# Patient Record
Sex: MALE | Race: WHITE | NOT HISPANIC OR LATINO | Employment: FULL TIME | URBAN - METROPOLITAN AREA
[De-identification: names, ages, dates, MRNs, and addresses within clinical notes are randomized per-mention and may not be internally consistent; named-entity substitution may affect disease eponyms.]

---

## 2023-07-10 LAB
ALANINE AMINOTRANSFERASE (SGPT) (U/L) IN SER/PLAS: 18 U/L (ref 10–52)
ALBUMIN (G/DL) IN SER/PLAS: 4.6 G/DL (ref 3.4–5)
ALKALINE PHOSPHATASE (U/L) IN SER/PLAS: 41 U/L (ref 33–120)
ANION GAP IN SER/PLAS: 13 MMOL/L (ref 10–20)
ASPARTATE AMINOTRANSFERASE (SGOT) (U/L) IN SER/PLAS: 17 U/L (ref 9–39)
BILIRUBIN TOTAL (MG/DL) IN SER/PLAS: 0.5 MG/DL (ref 0–1.2)
CALCIDIOL (25 OH VITAMIN D3) (NG/ML) IN SER/PLAS: 31 NG/ML
CALCIUM (MG/DL) IN SER/PLAS: 9.8 MG/DL (ref 8.6–10.6)
CARBON DIOXIDE, TOTAL (MMOL/L) IN SER/PLAS: 28 MMOL/L (ref 21–32)
CHLORIDE (MMOL/L) IN SER/PLAS: 103 MMOL/L (ref 98–107)
CHOLESTEROL (MG/DL) IN SER/PLAS: 203 MG/DL (ref 0–199)
CHOLESTEROL IN HDL (MG/DL) IN SER/PLAS: 93 MG/DL
CHOLESTEROL/HDL RATIO: 2.2
COBALAMIN (VITAMIN B12) (PG/ML) IN SER/PLAS: 243 PG/ML (ref 211–911)
CREATININE (MG/DL) IN SER/PLAS: 0.83 MG/DL (ref 0.5–1.3)
ERYTHROCYTE DISTRIBUTION WIDTH (RATIO) BY AUTOMATED COUNT: 13.2 % (ref 11.5–14.5)
ERYTHROCYTE MEAN CORPUSCULAR HEMOGLOBIN CONCENTRATION (G/DL) BY AUTOMATED: 31.9 G/DL (ref 32–36)
ERYTHROCYTE MEAN CORPUSCULAR VOLUME (FL) BY AUTOMATED COUNT: 96 FL (ref 80–100)
ERYTHROCYTES (10*6/UL) IN BLOOD BY AUTOMATED COUNT: 4.06 X10E12/L (ref 4.5–5.9)
FOLATE (NG/ML) IN SER/PLAS: 13.5 NG/ML
GFR MALE: >90 ML/MIN/1.73M2
GLUCOSE (MG/DL) IN SER/PLAS: 75 MG/DL (ref 74–99)
HEMATOCRIT (%) IN BLOOD BY AUTOMATED COUNT: 38.9 % (ref 41–52)
HEMOGLOBIN (G/DL) IN BLOOD: 12.4 G/DL (ref 13.5–17.5)
LDL: 98 MG/DL (ref 0–99)
LEUKOCYTES (10*3/UL) IN BLOOD BY AUTOMATED COUNT: 5.3 X10E9/L (ref 4.4–11.3)
NRBC (PER 100 WBCS) BY AUTOMATED COUNT: 0 /100 WBC (ref 0–0)
PLATELETS (10*3/UL) IN BLOOD AUTOMATED COUNT: 181 X10E9/L (ref 150–450)
POTASSIUM (MMOL/L) IN SER/PLAS: 4.8 MMOL/L (ref 3.5–5.3)
PROSTATE SPECIFIC ANTIGEN,SCREEN: 0.72 NG/ML (ref 0–4)
PROTEIN TOTAL: 7.5 G/DL (ref 6.4–8.2)
SODIUM (MMOL/L) IN SER/PLAS: 139 MMOL/L (ref 136–145)
THYROTROPIN (MIU/L) IN SER/PLAS BY DETECTION LIMIT <= 0.05 MIU/L: 1.58 MIU/L (ref 0.44–3.98)
TRIGLYCERIDE (MG/DL) IN SER/PLAS: 59 MG/DL (ref 0–149)
URATE (MG/DL) IN SER/PLAS: 7.1 MG/DL (ref 4–7.5)
UREA NITROGEN (MG/DL) IN SER/PLAS: 8 MG/DL (ref 6–23)
VLDL: 12 MG/DL (ref 0–40)

## 2023-07-13 LAB
FERRITIN (UG/LL) IN SER/PLAS: 363 UG/L (ref 20–300)
IRON (UG/DL) IN SER/PLAS: 177 UG/DL (ref 35–150)
IRON BINDING CAPACITY (UG/DL) IN SER/PLAS: 430 UG/DL (ref 240–445)
IRON SATURATION (%) IN SER/PLAS: 41 % (ref 25–45)

## 2024-01-08 ENCOUNTER — OFFICE VISIT (OUTPATIENT)
Dept: CARDIOLOGY | Facility: CLINIC | Age: 54
End: 2024-01-08
Payer: COMMERCIAL

## 2024-01-08 VITALS
BODY MASS INDEX: 33.92 KG/M2 | DIASTOLIC BLOOD PRESSURE: 70 MMHG | HEART RATE: 102 BPM | SYSTOLIC BLOOD PRESSURE: 120 MMHG | HEIGHT: 69 IN | WEIGHT: 229 LBS

## 2024-01-08 DIAGNOSIS — E78.5 HYPERLIPIDEMIA, UNSPECIFIED HYPERLIPIDEMIA TYPE: ICD-10-CM

## 2024-01-08 DIAGNOSIS — E66.3 OVERWEIGHT: ICD-10-CM

## 2024-01-08 DIAGNOSIS — I10 ESSENTIAL HYPERTENSION: Primary | ICD-10-CM

## 2024-01-08 PROBLEM — R22.42 LOCALIZED SWELLING OF LEFT LOWER EXTREMITY: Status: ACTIVE | Noted: 2024-01-08

## 2024-01-08 PROBLEM — R39.11 URINARY HESITANCY: Status: ACTIVE | Noted: 2024-01-08

## 2024-01-08 PROBLEM — M25.50 ARTHRALGIA: Status: ACTIVE | Noted: 2024-01-08

## 2024-01-08 PROBLEM — E55.9 VITAMIN D DEFICIENCY: Status: ACTIVE | Noted: 2024-01-08

## 2024-01-08 PROBLEM — R61 EXCESSIVE SWEATING: Status: ACTIVE | Noted: 2024-01-08

## 2024-01-08 PROBLEM — R11.0 NAUSEA IN ADULT: Status: ACTIVE | Noted: 2024-01-08

## 2024-01-08 PROBLEM — R42 LIGHTHEADEDNESS: Status: ACTIVE | Noted: 2024-01-08

## 2024-01-08 PROBLEM — I87.322 CHRONIC VENOUS HYPERTENSION WITH INFLAMMATION INVOLVING LEFT SIDE: Status: ACTIVE | Noted: 2024-01-08

## 2024-01-08 PROBLEM — N52.9 ERECTILE DYSFUNCTION: Status: ACTIVE | Noted: 2024-01-08

## 2024-01-08 PROBLEM — M10.9 GOUT: Status: ACTIVE | Noted: 2024-01-08

## 2024-01-08 PROBLEM — R19.4 BOWEL HABIT CHANGES: Status: ACTIVE | Noted: 2024-01-08

## 2024-01-08 PROBLEM — R07.9 CHEST PAIN OF UNCERTAIN ETIOLOGY: Status: ACTIVE | Noted: 2024-01-08

## 2024-01-08 PROBLEM — R79.89 LFT ELEVATION: Status: ACTIVE | Noted: 2024-01-08

## 2024-01-08 PROBLEM — B35.1 ONYCHOMYCOSIS: Status: ACTIVE | Noted: 2024-01-08

## 2024-01-08 PROCEDURE — 3074F SYST BP LT 130 MM HG: CPT | Performed by: INTERNAL MEDICINE

## 2024-01-08 PROCEDURE — 3078F DIAST BP <80 MM HG: CPT | Performed by: INTERNAL MEDICINE

## 2024-01-08 PROCEDURE — 99214 OFFICE O/P EST MOD 30 MIN: CPT | Performed by: INTERNAL MEDICINE

## 2024-01-08 RX ORDER — ASPIRIN 81 MG/1
81 TABLET ORAL DAILY
COMMUNITY

## 2024-01-08 RX ORDER — LISINOPRIL 10 MG/1
10 TABLET ORAL DAILY
COMMUNITY
End: 2024-02-05 | Stop reason: SDUPTHER

## 2024-01-08 RX ORDER — CARVEDILOL 6.25 MG/1
6.25 TABLET ORAL 2 TIMES DAILY
COMMUNITY
End: 2024-05-10 | Stop reason: SDUPTHER

## 2024-01-08 RX ORDER — CYANOCOBALAMIN (VITAMIN B-12) 250 MCG
1000 TABLET ORAL DAILY
COMMUNITY

## 2024-01-08 ASSESSMENT — PATIENT HEALTH QUESTIONNAIRE - PHQ9
1. LITTLE INTEREST OR PLEASURE IN DOING THINGS: NOT AT ALL
SUM OF ALL RESPONSES TO PHQ9 QUESTIONS 1 AND 2: 0
2. FEELING DOWN, DEPRESSED OR HOPELESS: NOT AT ALL

## 2024-01-08 NOTE — PROGRESS NOTES
"1. Hypertension  2. Hyperlipidemia  3. Chewing tobacco  4. EtOH use  5. Noncompliant with medical advice  6. Overweight BMI 32.9    Patient is a pleasant 53-year-old male with the above-noted pertinent past medical history presents today for follow-up he has no complaints of exertional chest pain or shortness of breath he continues to be very active with his job he does not have scheduled exercises, he states that occasionally he has a slight discomfort in the left side of the chest that has been present unrelated to exertion for \"some time \"there is no associated symptoms, he states he has been having no difficulty with care and activities around the house and job, he denies orthopnea PND palpitation or syncopal episode    Vital signs reviewed and stable BMI is elevated at 33.8 previously at 32.9 patient is a well-developed well-nourished male in no acute distress speaking full sentences no carotid bruits upstroke and volumes are normal, heart rate and rhythm are regular S1 and S2 are normal no gallop or murmurs, lungs are clear to auscultation and normal air entry abdomen is distended positive bowel sounds soft and nontender    July 2023  Total cholesterol 203, triglyceride 59, HDL 93, and LDL of 98  Sodium 139 potassium 4.8, BUN 8 and creatinine of 0.83 with normal liver function test    53-year-old male with multiple coronary artery disease risk factors including hypertension hyperlipidemia and tobacco chew the importance of discontinuation tobacco consumption was extensively discussed and recommendations were made, today blood pressure is under good control, his latest blood work was reviewed and discussed with him, patient is also provided with requisition for CBC CMP and a lipid panel to be obtained in July 2024 which is about 1 year from his prior study patient questions regarding activity and exercises were dressed, patient is to return to my clinic in 1 year for follow-up.  "

## 2024-02-05 DIAGNOSIS — I10 ESSENTIAL HYPERTENSION: Primary | ICD-10-CM

## 2024-02-05 RX ORDER — LISINOPRIL 10 MG/1
10 TABLET ORAL DAILY
Qty: 90 TABLET | Refills: 1 | Status: SHIPPED | OUTPATIENT
Start: 2024-02-05

## 2024-05-10 DIAGNOSIS — I10 ESSENTIAL HYPERTENSION: Primary | ICD-10-CM

## 2024-05-13 RX ORDER — CARVEDILOL 6.25 MG/1
6.25 TABLET ORAL 2 TIMES DAILY
Qty: 90 TABLET | Refills: 2 | Status: SHIPPED | OUTPATIENT
Start: 2024-05-13

## 2024-08-26 DIAGNOSIS — I10 ESSENTIAL HYPERTENSION: ICD-10-CM

## 2024-08-29 DIAGNOSIS — I10 ESSENTIAL HYPERTENSION: ICD-10-CM

## 2024-08-29 RX ORDER — LISINOPRIL 10 MG/1
10 TABLET ORAL DAILY
Qty: 90 TABLET | Refills: 1 | Status: SHIPPED | OUTPATIENT
Start: 2024-08-29

## 2024-10-01 DIAGNOSIS — I10 ESSENTIAL HYPERTENSION: ICD-10-CM

## 2024-10-01 RX ORDER — LISINOPRIL 10 MG/1
10 TABLET ORAL DAILY
Qty: 90 TABLET | Refills: 1 | Status: SHIPPED | OUTPATIENT
Start: 2024-10-01

## 2024-11-05 DIAGNOSIS — I10 ESSENTIAL HYPERTENSION: ICD-10-CM

## 2024-11-07 RX ORDER — CARVEDILOL 6.25 MG/1
6.25 TABLET ORAL 2 TIMES DAILY
Qty: 180 TABLET | Refills: 1 | Status: SHIPPED | OUTPATIENT
Start: 2024-11-07 | End: 2025-05-06

## 2025-01-06 ENCOUNTER — APPOINTMENT (OUTPATIENT)
Dept: CARDIOLOGY | Facility: CLINIC | Age: 55
End: 2025-01-06
Payer: COMMERCIAL

## 2025-01-09 ENCOUNTER — APPOINTMENT (OUTPATIENT)
Dept: CARDIOLOGY | Facility: CLINIC | Age: 55
End: 2025-01-09
Payer: COMMERCIAL

## 2025-01-09 VITALS
DIASTOLIC BLOOD PRESSURE: 78 MMHG | HEART RATE: 84 BPM | HEIGHT: 69 IN | BODY MASS INDEX: 32.2 KG/M2 | WEIGHT: 217.4 LBS | SYSTOLIC BLOOD PRESSURE: 132 MMHG | TEMPERATURE: 97.3 F

## 2025-01-09 DIAGNOSIS — R94.31 ABNORMAL EKG: ICD-10-CM

## 2025-01-09 DIAGNOSIS — F10.90 ALCOHOL USE DISORDER: ICD-10-CM

## 2025-01-09 DIAGNOSIS — R07.9 CHEST PAIN OF UNCERTAIN ETIOLOGY: ICD-10-CM

## 2025-01-09 DIAGNOSIS — I10 ESSENTIAL HYPERTENSION: Primary | ICD-10-CM

## 2025-01-09 PROCEDURE — 93000 ELECTROCARDIOGRAM COMPLETE: CPT | Performed by: INTERNAL MEDICINE

## 2025-01-09 PROCEDURE — 3008F BODY MASS INDEX DOCD: CPT | Performed by: INTERNAL MEDICINE

## 2025-01-09 PROCEDURE — 3075F SYST BP GE 130 - 139MM HG: CPT | Performed by: INTERNAL MEDICINE

## 2025-01-09 PROCEDURE — 99214 OFFICE O/P EST MOD 30 MIN: CPT | Performed by: INTERNAL MEDICINE

## 2025-01-09 PROCEDURE — 3078F DIAST BP <80 MM HG: CPT | Performed by: INTERNAL MEDICINE

## 2025-01-09 RX ORDER — ASCORBIC ACID 250 MG
250 TABLET,CHEWABLE ORAL
COMMUNITY

## 2025-01-09 ASSESSMENT — ENCOUNTER SYMPTOMS
GASTROINTESTINAL NEGATIVE: 1
CARDIOVASCULAR NEGATIVE: 1
NEUROLOGICAL NEGATIVE: 1
SHORTNESS OF BREATH: 0
ARTHRALGIAS: 1
CONSTITUTIONAL NEGATIVE: 1
PSYCHIATRIC NEGATIVE: 1

## 2025-01-09 NOTE — PATIENT INSTRUCTIONS
Wean down alcohol by 2 drinks per day every week until down to 6 beers per day    Get lab work done  Get CT cardiac score.     Make appointment to see primary care physician    Return in 6 months  Will call you if CT cardiac score is abnormal.

## 2025-01-09 NOTE — PROGRESS NOTES
Patient:  Blair Machado  YOB: 1970  MRN: [id number]       Chief Complaint/Active Symptoms:       Blair Machado is a 54 y.o. male who returns today for cardiac follow-up.    Patient is being seen in annual follow-up with a history of hypertension and hyperlipidemia.    No chest pain or discomfort, no shortness of breath, orthopnea or PND. No edema. No palpitations, syncope or near syncope. Chews tobacco but does not smoke. Still drinking a 12 pack of beer a day.     Still employed full time as a . No regular exercise regimen outside his employment.     Review of Systems   Constitutional: Negative.    HENT: Negative.     Respiratory:  Negative for shortness of breath.    Cardiovascular: Negative.    Gastrointestinal: Negative.    Genitourinary: Negative.    Musculoskeletal:  Positive for arthralgias.   Neurological: Negative.    Psychiatric/Behavioral: Negative.     All other systems reviewed and are negative.      Objective:     Vitals:    01/09/25 1453   BP: 132/78   Pulse: 84   Temp: 36.3 °C (97.3 °F)       Vitals:    01/09/25 1453   Weight: 98.6 kg (217 lb 6.4 oz)       Allergies:     No Known Allergies       Medications:     Current Outpatient Medications   Medication Instructions    ascorbic acid (VITAMIN C) 250 mg, oral    carvedilol (COREG) 6.25 mg, oral, 2 times daily    cyanocobalamin (VITAMIN B-12) 1,000 mcg, Daily    lisinopril 10 mg, oral, Daily       Physical Examination:   GENERAL:  Well developed, well nourished, in no acute distress.  HEENT: NC AT, EOMI with anicteric sclera  NECK:  Supple, no JVD, no bruit.  CHEST:  Symmetric and nontender.  LUNGS:  Clear to auscultation bilaterally, normal respiratory effort.  Diminished at the bases  HEART: PMI is not palpable.  There is a regular rhythm with a normal S1 and S2 without appreciable S3 or murmur.  Normal distal perfusion without edema   ABDOMEN: Soft, NT, ND without palpable organomegaly or bruits.  Hyperactive bowel  "sounds  EXTREMITIES:  Warm with good color, no clubbing or cyanosis.  There is no edema noted.  PERIPHERAL VASCULAR:  Pulses present and equally palpable.  MUSCULOSKELETAL: Osteoarthritic changes  NEURO/PSYCH:  Alert and oriented times three with approppriate behavior and responses. Nonfocal motor examination with normal gait and ambulation  Lymph: No significant palpable lymphadenopathy  Skin: no rash or lesions on exposed skin or reported.    Lab:     CBC:   Lab Results   Component Value Date    WBC 5.3 07/10/2023    RBC 4.06 (L) 07/10/2023    HGB 12.4 (L) 07/10/2023    HCT 38.9 (L) 07/10/2023     07/10/2023        CMP:    Lab Results   Component Value Date     07/10/2023    K 4.8 07/10/2023     07/10/2023    CO2 28 07/10/2023    BUN 8 07/10/2023    CREATININE 0.83 07/10/2023    GLUCOSE 75 07/10/2023    CALCIUM 9.8 07/10/2023       Magnesium:    No results found for: \"MG\"    Lipid Profile:    Lab Results   Component Value Date    TRIG 59 07/10/2023    HDL 93.0 07/10/2023       TSH:    Lab Results   Component Value Date    TSH 1.58 07/10/2023       BNP:   No results found for: \"BNP\"     PT/INR:    No results found for: \"PROTIME\", \"INR\"    HgBA1c:    No results found for: \"HGBA1C\"    BMP:  Lab Results   Component Value Date     07/10/2023     04/16/2021     08/29/2019    K 4.8 07/10/2023    K 4.8 04/16/2021    K 4.2 08/29/2019     07/10/2023     04/16/2021     08/29/2019    CO2 28 07/10/2023    CO2 25 04/16/2021    CO2 27 08/29/2019    BUN 8 07/10/2023    BUN 11 04/16/2021    BUN 7 08/29/2019    CREATININE 0.83 07/10/2023    CREATININE 0.69 04/16/2021    CREATININE 0.72 08/29/2019       Cardiac Enzymes:    No results found for: \"TROPHS\"    Hepatic Function Panel:    Lab Results   Component Value Date    ALKPHOS 41 07/10/2023    ALT 18 07/10/2023    AST 17 07/10/2023    PROT 7.5 07/10/2023    BILITOT 0.5 07/10/2023     Diagnostic Studies:     Echo in 2019 with " normal LV function    EKG:   EKG today - NSR, LAD    Radiology:     CT cardiac scoring wo IV contrast    (Results Pending)     ASSESSMENT     Problem List Items Addressed This Visit       RESOLVED: Chest pain of uncertain etiology    Relevant Orders    CBC and Auto Differential    Lipid Panel    Essential hypertension - Primary    Relevant Orders    ECG 12 lead (Clinic Performed) (Completed)    Comprehensive Metabolic Panel    CT cardiac scoring wo IV contrast    Follow Up In Cardiology    Alcohol use disorder       PLAN     1.  Hypertension.  Blood pressures are acceptable on his current medical regimen.  We talked about how his alcohol use affects his blood pressure as well as liver and heart function.  He has no signs or symptoms of heart failure today.  For now we will continue his current medical regimen.  2.  History of chest pain.  No recurrence.  With his minor changes and abnormalities on his EKG I recommended a CT cardiac score.  If his CT cardiac score is elevated would recommend stress testing for risk stratification.  Have also requested a lipid panel to assess cardiovascular risk.  3.  Alcohol use disorder.  We talked to the patient that his current alcohol use is above that which is recommended for man for healthy living.  We suggested he curtail his alcohol use cutting down by 2 drinks every week till he gets down to at least a sixpack a night.  Will be checking his liver function.  He has a history of LFT abnormalities we will see was present on his labs when they are done.    Will see the patient in follow-up in 6 months if his CT cardiac score is low risk.  Once he starts seeing his primary care physician again on a regular basis we can alternate appointments each of us seeing him once a year.